# Patient Record
Sex: MALE | URBAN - METROPOLITAN AREA
[De-identification: names, ages, dates, MRNs, and addresses within clinical notes are randomized per-mention and may not be internally consistent; named-entity substitution may affect disease eponyms.]

---

## 2017-08-13 ENCOUNTER — EMERGENCY (EMERGENCY)
Facility: HOSPITAL | Age: 57
LOS: 0 days | Discharge: HOME | End: 2017-08-13
Attending: EMERGENCY MEDICINE | Admitting: EMERGENCY MEDICINE
Payer: COMMERCIAL

## 2017-08-13 ENCOUNTER — EMERGENCY (EMERGENCY)
Facility: HOSPITAL | Age: 57
LOS: 0 days | Discharge: HOME | End: 2017-08-13
Attending: EMERGENCY MEDICINE
Payer: COMMERCIAL

## 2017-08-13 VITALS
HEART RATE: 74 BPM | RESPIRATION RATE: 17 BRPM | DIASTOLIC BLOOD PRESSURE: 74 MMHG | TEMPERATURE: 98 F | SYSTOLIC BLOOD PRESSURE: 126 MMHG | OXYGEN SATURATION: 100 %

## 2017-08-13 VITALS
WEIGHT: 175.05 LBS | HEIGHT: 66 IN | OXYGEN SATURATION: 98 % | SYSTOLIC BLOOD PRESSURE: 115 MMHG | RESPIRATION RATE: 18 BRPM | DIASTOLIC BLOOD PRESSURE: 72 MMHG | HEART RATE: 72 BPM | TEMPERATURE: 99 F

## 2017-08-13 VITALS — TEMPERATURE: 98 F

## 2017-08-13 DIAGNOSIS — C20 MALIGNANT NEOPLASM OF RECTUM: ICD-10-CM

## 2017-08-13 DIAGNOSIS — R10.9 UNSPECIFIED ABDOMINAL PAIN: ICD-10-CM

## 2017-08-13 DIAGNOSIS — Z93.3 COLOSTOMY STATUS: ICD-10-CM

## 2017-08-13 DIAGNOSIS — R10.84 GENERALIZED ABDOMINAL PAIN: ICD-10-CM

## 2017-08-13 DIAGNOSIS — Z98.890 OTHER SPECIFIED POSTPROCEDURAL STATES: ICD-10-CM

## 2017-08-13 LAB
ALBUMIN SERPL ELPH-MCNC: 3.8 G/DL — SIGNIFICANT CHANGE UP (ref 3.3–5)
ALP SERPL-CCNC: 83 U/L — SIGNIFICANT CHANGE UP (ref 40–120)
ALT FLD-CCNC: 27 U/L — SIGNIFICANT CHANGE UP (ref 12–78)
ANION GAP SERPL CALC-SCNC: 7 MMOL/L — SIGNIFICANT CHANGE UP (ref 5–17)
APTT BLD: 28.5 SEC — SIGNIFICANT CHANGE UP (ref 27.5–37.4)
AST SERPL-CCNC: 32 U/L — SIGNIFICANT CHANGE UP (ref 15–37)
BASOPHILS # BLD AUTO: 0.1 K/UL — SIGNIFICANT CHANGE UP (ref 0–0.2)
BASOPHILS NFR BLD AUTO: 0.8 % — SIGNIFICANT CHANGE UP (ref 0–2)
BILIRUB SERPL-MCNC: 0.6 MG/DL — SIGNIFICANT CHANGE UP (ref 0.2–1.2)
BUN SERPL-MCNC: 16 MG/DL — SIGNIFICANT CHANGE UP (ref 7–23)
CALCIUM SERPL-MCNC: 9.4 MG/DL — SIGNIFICANT CHANGE UP (ref 8.5–10.1)
CHLORIDE SERPL-SCNC: 104 MMOL/L — SIGNIFICANT CHANGE UP (ref 96–108)
CO2 SERPL-SCNC: 27 MMOL/L — SIGNIFICANT CHANGE UP (ref 22–31)
CREAT SERPL-MCNC: 1.35 MG/DL — HIGH (ref 0.5–1.3)
EOSINOPHIL # BLD AUTO: 0.1 K/UL — SIGNIFICANT CHANGE UP (ref 0–0.5)
EOSINOPHIL NFR BLD AUTO: 1.5 % — SIGNIFICANT CHANGE UP (ref 0–6)
GLUCOSE SERPL-MCNC: 116 MG/DL — HIGH (ref 70–99)
HCT VFR BLD CALC: 41 % — SIGNIFICANT CHANGE UP (ref 39–50)
HGB BLD-MCNC: 14.6 G/DL — SIGNIFICANT CHANGE UP (ref 13–17)
INR BLD: 1.16 RATIO — SIGNIFICANT CHANGE UP (ref 0.88–1.16)
LACTATE SERPL-SCNC: 1.1 MMOL/L — SIGNIFICANT CHANGE UP (ref 0.7–2)
LYMPHOCYTES # BLD AUTO: 1.4 K/UL — SIGNIFICANT CHANGE UP (ref 1–3.3)
LYMPHOCYTES # BLD AUTO: 16.8 % — SIGNIFICANT CHANGE UP (ref 13–44)
MCHC RBC-ENTMCNC: 30.9 PG — SIGNIFICANT CHANGE UP (ref 27–34)
MCHC RBC-ENTMCNC: 35.6 GM/DL — SIGNIFICANT CHANGE UP (ref 32–36)
MCV RBC AUTO: 86.9 FL — SIGNIFICANT CHANGE UP (ref 80–100)
MONOCYTES # BLD AUTO: 0.5 K/UL — SIGNIFICANT CHANGE UP (ref 0–0.9)
MONOCYTES NFR BLD AUTO: 5.5 % — SIGNIFICANT CHANGE UP (ref 2–14)
NEUTROPHILS # BLD AUTO: 6.5 K/UL — SIGNIFICANT CHANGE UP (ref 1.8–7.4)
NEUTROPHILS NFR BLD AUTO: 75.6 % — SIGNIFICANT CHANGE UP (ref 43–77)
PLATELET # BLD AUTO: 168 K/UL — SIGNIFICANT CHANGE UP (ref 150–400)
POTASSIUM SERPL-MCNC: 4.1 MMOL/L — SIGNIFICANT CHANGE UP (ref 3.5–5.3)
POTASSIUM SERPL-SCNC: 4.1 MMOL/L — SIGNIFICANT CHANGE UP (ref 3.5–5.3)
PROT SERPL-MCNC: 8.1 GM/DL — SIGNIFICANT CHANGE UP (ref 6–8.3)
PROTHROM AB SERPL-ACNC: 12.6 SEC — SIGNIFICANT CHANGE UP (ref 9.8–12.7)
RBC # BLD: 4.71 M/UL — SIGNIFICANT CHANGE UP (ref 4.2–5.8)
RBC # FLD: 11.2 % — SIGNIFICANT CHANGE UP (ref 10.3–14.5)
SODIUM SERPL-SCNC: 138 MMOL/L — SIGNIFICANT CHANGE UP (ref 135–145)
WBC # BLD: 8.6 K/UL — SIGNIFICANT CHANGE UP (ref 3.8–10.5)
WBC # FLD AUTO: 8.6 K/UL — SIGNIFICANT CHANGE UP (ref 3.8–10.5)

## 2017-08-13 PROCEDURE — 99285 EMERGENCY DEPT VISIT HI MDM: CPT | Mod: 25

## 2017-08-13 PROCEDURE — 99285 EMERGENCY DEPT VISIT HI MDM: CPT

## 2017-08-13 PROCEDURE — 74176 CT ABD & PELVIS W/O CONTRAST: CPT | Mod: 26

## 2017-08-13 RX ORDER — CIPROFLOXACIN LACTATE 400MG/40ML
1 VIAL (ML) INTRAVENOUS
Qty: 20 | Refills: 0 | OUTPATIENT
Start: 2017-08-13 | End: 2017-08-23

## 2017-08-13 RX ORDER — HYDROMORPHONE HYDROCHLORIDE 2 MG/ML
1 INJECTION INTRAMUSCULAR; INTRAVENOUS; SUBCUTANEOUS ONCE
Qty: 0 | Refills: 0 | Status: DISCONTINUED | OUTPATIENT
Start: 2017-08-13 | End: 2017-08-13

## 2017-08-13 RX ORDER — SODIUM CHLORIDE 9 MG/ML
1000 INJECTION INTRAMUSCULAR; INTRAVENOUS; SUBCUTANEOUS ONCE
Qty: 0 | Refills: 0 | Status: COMPLETED | OUTPATIENT
Start: 2017-08-13 | End: 2017-08-13

## 2017-08-13 RX ORDER — METRONIDAZOLE 500 MG
1 TABLET ORAL
Qty: 30 | Refills: 0 | OUTPATIENT
Start: 2017-08-13 | End: 2017-08-23

## 2017-08-13 RX ADMIN — HYDROMORPHONE HYDROCHLORIDE 1 MILLIGRAM(S): 2 INJECTION INTRAMUSCULAR; INTRAVENOUS; SUBCUTANEOUS at 16:08

## 2017-08-13 RX ADMIN — HYDROMORPHONE HYDROCHLORIDE 1 MILLIGRAM(S): 2 INJECTION INTRAMUSCULAR; INTRAVENOUS; SUBCUTANEOUS at 13:57

## 2017-08-13 RX ADMIN — HYDROMORPHONE HYDROCHLORIDE 1 MILLIGRAM(S): 2 INJECTION INTRAMUSCULAR; INTRAVENOUS; SUBCUTANEOUS at 23:00

## 2017-08-13 RX ADMIN — HYDROMORPHONE HYDROCHLORIDE 1 MILLIGRAM(S): 2 INJECTION INTRAMUSCULAR; INTRAVENOUS; SUBCUTANEOUS at 23:13

## 2017-08-13 RX ADMIN — SODIUM CHLORIDE 1000 MILLILITER(S): 9 INJECTION INTRAMUSCULAR; INTRAVENOUS; SUBCUTANEOUS at 13:57

## 2017-08-13 NOTE — ED PROVIDER NOTE - PROGRESS NOTE DETAILS
Multiple episodes of patient yelling aggressively at staff, saying he can't control himself because he was placed in the hallway. Demanding Ativan for anxiety in addition to more Dilaudid. Pt also accusing nurse of "giving me saline" instead of pain medication. Multiple attempts by myself and RN at verbal de-escalation. Patient also offered privacy screen for privacy in hallway. Patient remained aggressive, yelling and accusing staff of not taking care of him. Patient then demanded to leave. I did not have an opportunity to discuss conditions of patient leaving AMA prior to full treatment. Pt removed his own IV and threw across hallway. He then stormed down hallway, struck and displaced ambulance doors. Security followed patient, who threw himself on ground outside ED. Police contacted, awaiting their arrival. - Amari Hart MD Ceferino MASSEY: patient returned to ED by members of administration and security. to be cared for by Dr. De La Vega. - Amari Hart MD Multiple episodes of patient yelling in hallway, saying he can't control himself because he was placed in the hallway and "people are staring at me." Demanding Ativan for anxiety in addition to more Dilaudid (given 2 doses thus far for pain management). Pt also accusing nurse of "giving me saline" instead of pain medication. Multiple attempts by myself and RN at verbal de-escalation. Patient offered privacy screen for privacy in hallway as additional de-escalation attempt. Patient remained aggressive, yelling and accusing staff of not taking care of him and acting in a threatening manner. Patient then demanded to leave. I did not have an opportunity to discuss conditions of patient leaving AMA prior to full treatment. Pt threw down privacy screen, removed his own IV and threw across hallway. He then stormed down hallway, struck and broke open ambulance doors. Security followed patient, who threw himself on ground outside ED. Police contacted for safety of staff and other patients, awaiting their arrival. - Amari Hart MD Ceferino MASSEY:  Pt Stormed out of ED earlier and was brought back after pt calmed down.  Pt was reregistered but care will be resumed.  Documented on separate note given re-registration.  Because pt was only ouside for about 10minutes then returned, I rc'd signout (17:00) from Dr. Hart and continued initial plan.  23:00 was final reeval and dispo, again, see separate note.

## 2017-08-13 NOTE — ED ADULT NURSE NOTE - OBJECTIVE STATEMENT
pt assessed at 1335 at bedside. pt describes pain in all quadrants described as cramping, which is "normal for me" but worsening cramping over past 3 months. pt seen and discharged from [facility] ED for same symptoms last week. pt states "we will get along great as long as you have a syringe of dilaudid". pt states he takes chronic opioids for treatment of his abdominal pain. will relay message to MD for appropriate pain control. pt refusing rectal temp states "I don't have a rectum"

## 2017-08-13 NOTE — ED PROVIDER NOTE - CONSTITUTIONAL, MLM
normal... Well appearing, well nourished.  Laying in bed comfortably.  When spoken to pt is quick to speak loud and yell.

## 2017-08-13 NOTE — ED PROVIDER NOTE - CHPI ED SYMPTOMS NEG
no chills/no vomiting/no abdominal distension/no hematuria/no burning urination/no dysuria/no fever/no blood in stool/no nausea/no diarrhea

## 2017-08-13 NOTE — ED ADULT NURSE REASSESSMENT NOTE - NS ED NURSE REASSESS COMMENT FT1
Patient resting comfortably. Requesting pain medication from MD. MD states patient has been resting since being registered in ED for the second time MD awaiting CT results before proceeding. Patient brother at bedside. One to one in place. Brother states the patient lives at home with his father and does not feel that it is a safe living situation. Patient brother states he thinks patient has opioid dependance and anger issues. Patient calm at this time. Will continue to monitor

## 2017-08-13 NOTE — ED PROVIDER NOTE - OBJECTIVE STATEMENT
Patient comes to ED for bilateral abdominal pain. pt has a hx of rectal cancer and a left colostomy Triage complaint: Patient comes to ED for bilateral abdominal pain. pt has a hx of rectal cancer and a left colostomy  57yr old male w/ history of rectal cancer, hx of colostomy    56 y/o M PMHx rectal ca, multiple surgeries for abd adhesions, +colostomy, presents to the ED c/o abd pain x "months." The pt provides that he hasn't been able to sleep since the past 3 days after having abd cramping. Pain severe, generalized, non-radiating. No h/o nv, no blood in stool, headache, fever, dizziness, cp, cough, or rash. GI Dr. Salguero PMD Dr. Frost. [facility] for pain management. Triage complaint: Patient comes to ED for bilateral abdominal pain. pt has a hx of rectal cancer and a left colostomy  57yr old male w/ history of rectal cancer, hx of colostomy, adhesions, radiation tx, multiple surgeries presenting w/ abdominal pain worse over the past few days.  Denies fever.  F/w Dr. Salguero. Also in pain management at [facility].   Pt was seen here today and stormed out of the yelling at staff.  Security and administration and ED staff present.  Pt was de-escalated and brought back in after being outside on the floor.  Pt was reregistered.  I rc'd signout from ED MD and will continue plan set in place.  Pt has normal Labs.  Will get CT.

## 2017-08-13 NOTE — ED ADULT NURSE REASSESSMENT NOTE - NS ED NURSE REASSESS COMMENT FT1
pt screaming out in hallway and demanding a private room. md at bedside, dara rossi at bedside, charge nurse jaylan montiel. pt being managed for pain appropriately as ordered. pt states "I DON'T WANT PEOPLE LOOKING AT ME". pt accusing myself of only giving him saline through the IV instead of Dilaudid.
security called to bedside, pt repeatedly punching stretcher.
pt angry about being in the hallway because he "does not want people staring at him". pt offered and accepted privacy screen which is at bedside for further privacy. security at the bedside for protection of the staff because patient is physically threatening with fists. pt screaming and punching the bed and his fists and in the air uncontrollably at ER physician (Hailey), myself, security (Gianluca) and ANM Kaci. pt generally upset that ER MD will not give him "tranquilizers". pt removed own IV from RT AC and threw it across the hallway trailing blood across the floor. pt then threw the privacy screen at the staff and another patient nearby.  pt exited his stretcher out of the end and ran down the rodriguez out through the ambulance glass doors, forcibly breaking 2 doors in the process before sitting down on the floor in the ambulance bay outside. 911 called for the safety of the staff, patient and other patients.

## 2017-08-13 NOTE — ED PROVIDER NOTE - MEDICAL DECISION MAKING DETAILS
56 y/o M c/o abd pain, s/p rectal ca, to obtain pain meds reassess. 56 y/o M h/o multiple abd surgeries s/p rectal ca, with colostomy, with abdominal pain, although no nv, normal stool output. will obtain labs, ct ap to eval for abscess (states has had many previously) or obstruction, give analgesia as necessary, re-assess. - Amari Hart MD

## 2017-08-13 NOTE — ED PROVIDER NOTE - MEDICAL DECISION MAKING DETAILS
57yr old male w/ rectal ca s/p surgeries and radiation p/w acute on chronic pain.  Pt had labs which were normal.  Awaiting CT.  Pulled out IV 3 times and does not want IV replaced.  Pt to get CT Abd/pelvis w/o contrast.  RN staff asked pt if he wanted to s/w psych given his anger and behavior but pt declined.

## 2017-08-13 NOTE — ED PROVIDER NOTE - OBJECTIVE STATEMENT
58 y/o M PMHx rectal ca, +colostomy, presents to the ED c/o abd pain. The pt provides that he hasn't been able to sleep since the past 3 days after having abd cramping. No h/o nvd, no blood in stool, headache, fever, dizziness, cp, cough, or rash. GI Dr. Salguero PMD Dr. Frost. [facility] for pain management. 58 y/o M PMHx rectal ca, multiple surgeries for abd adhesions, +colostomy, presents to the ED c/o abd pain x "months." The pt provides that he hasn't been able to sleep since the past 3 days after having abd cramping. Pain severe, generalized, non-radiating. No h/o nv, no blood in stool, headache, fever, dizziness, cp, cough, or rash. GI Dr. Salguero PMD Dr. Frost. [facility] for pain management.

## 2017-08-13 NOTE — ED PROVIDER NOTE - PROGRESS NOTE DETAILS
Pt has remained calm since initial incident.  Brother at bedside and has calming affect on brother.  Discussed plan with brother and pt.  Discussed findings on CT, normal labs.  Pt says that his infections don't cause fever or blood work to be off.  Given pain pt agrees to one additional dose to dilaudid prior to discharge.  I discussed w/ him that I will send a prescription for anitibotics to pharmacy to [place] for colitis (cipro/flagyl).  Pt wants to call Dr. Shah tomorrow morning to discuss CT results and if concern will start that antibiotics.  Also discussed that this could be radiation, post surgical, or cancer related changes and he needs to s/w his doctor.  Brother is present and says he will take patient home and look after him. He feels comfortable taking him home now that pt is calm.  They have been given paper copy of labs and CT done from today.

## 2017-08-14 DIAGNOSIS — Z93.3 COLOSTOMY STATUS: Chronic | ICD-10-CM

## 2017-08-14 DIAGNOSIS — Z98.890 OTHER SPECIFIED POSTPROCEDURAL STATES: Chronic | ICD-10-CM

## 2018-07-16 PROBLEM — C20 MALIGNANT NEOPLASM OF RECTUM: Chronic | Status: INACTIVE | Noted: 2017-08-13 | Resolved: 2017-08-14

## 2020-01-27 NOTE — ED PROVIDER NOTE - PSYCHIATRIC, MLM
Nutrition, metabolism, and development symptoms Alert and oriented to person, place, time/situation. normal mood and affect. no apparent risk to self or others.